# Patient Record
Sex: FEMALE | Race: OTHER | NOT HISPANIC OR LATINO | Employment: FULL TIME | URBAN - METROPOLITAN AREA
[De-identification: names, ages, dates, MRNs, and addresses within clinical notes are randomized per-mention and may not be internally consistent; named-entity substitution may affect disease eponyms.]

---

## 2024-11-19 ENCOUNTER — OFFICE VISIT (OUTPATIENT)
Dept: URGENT CARE | Age: 59
End: 2024-11-19
Payer: COMMERCIAL

## 2024-11-19 VITALS
DIASTOLIC BLOOD PRESSURE: 75 MMHG | HEART RATE: 71 BPM | SYSTOLIC BLOOD PRESSURE: 113 MMHG | HEIGHT: 62 IN | WEIGHT: 155 LBS | OXYGEN SATURATION: 97 % | BODY MASS INDEX: 28.52 KG/M2 | RESPIRATION RATE: 20 BRPM | TEMPERATURE: 98 F

## 2024-11-19 DIAGNOSIS — N30.00 ACUTE CYSTITIS WITHOUT HEMATURIA: Primary | ICD-10-CM

## 2024-11-19 DIAGNOSIS — R39.15 URGENCY OF MICTURITION: ICD-10-CM

## 2024-11-19 DIAGNOSIS — Z87.440 HX OF RECURRENT URINARY TRACT INFECTION: ICD-10-CM

## 2024-11-19 LAB
POC APPEARANCE, URINE: CLEAR
POC BILIRUBIN, URINE: NEGATIVE
POC BLOOD, URINE: NEGATIVE
POC COLOR, URINE: YELLOW
POC GLUCOSE, URINE: NEGATIVE MG/DL
POC KETONES, URINE: NEGATIVE MG/DL
POC LEUKOCYTES, URINE: ABNORMAL
POC NITRITE,URINE: NEGATIVE
POC PH, URINE: 6 PH
POC PROTEIN, URINE: NEGATIVE MG/DL
POC SPECIFIC GRAVITY, URINE: 1.01
POC UROBILINOGEN, URINE: 0.2 EU/DL

## 2024-11-19 PROCEDURE — 87086 URINE CULTURE/COLONY COUNT: CPT

## 2024-11-19 RX ORDER — NITROFURANTOIN 25; 75 MG/1; MG/1
100 CAPSULE ORAL 2 TIMES DAILY
Qty: 14 CAPSULE | Refills: 0 | Status: SHIPPED | OUTPATIENT
Start: 2024-11-19 | End: 2024-11-19 | Stop reason: SINTOL

## 2024-11-19 RX ORDER — AMOXICILLIN AND CLAVULANATE POTASSIUM 875; 125 MG/1; MG/1
1 TABLET, FILM COATED ORAL 2 TIMES DAILY
Qty: 14 TABLET | Refills: 0 | Status: SHIPPED | OUTPATIENT
Start: 2024-11-19 | End: 2024-11-26

## 2024-11-19 ASSESSMENT — PAIN SCALES - GENERAL: PAINLEVEL_OUTOF10: 0-NO PAIN

## 2024-11-20 NOTE — PROGRESS NOTES
"Subjective   Patient ID: Lia Rider is a 58 y.o. female. They present today with a chief complaint of Difficulty Urinating (Pt stated feels pressure urinating and feels like going often. Pt follows up with an urologist through ).    History of Present Illness  Lia is a 58-year-old female with history of recurrent UTIs who presents to the urgent care for evaluation of urinary urgency and sensation of incomplete voiding for several days duration.  Patient states symptoms worsened throughout the day.  Patient denies associated vomiting, severe abdominal pain or diarrhea.  Patient states she follows with a urologist for definitive management and treatment.  Patient is seeking evaluation reassurance and treatment options in the meantime.  No other symptoms or concerns otherwise reported.      Past Medical History  Allergies as of 11/19/2024 - Reviewed 11/19/2024   Allergen Reaction Noted    Nitrofurantoin monohyd/m-cryst Hives, Itching, Nausea Only, and Swelling 02/18/2013    Sulfamethoxazole-trimethoprim GI Upset 09/04/2018       (Not in a hospital admission)       No past medical history on file.    No past surgical history on file.     reports that she has never smoked. She has never used smokeless tobacco.    Review of Systems  A 10-point review of systems was performed, otherwise unremarkable unless stated in the history of present illness.                Objective    Vitals:    11/19/24 1926   BP: 113/75   BP Location: Left arm   Patient Position: Sitting   BP Cuff Size: Adult   Pulse: 71   Resp: 20   Temp: 36.7 °C (98 °F)   TempSrc: Oral   SpO2: 97%   Weight: 70.3 kg (155 lb)   Height: 1.575 m (5' 2\")     No LMP recorded.    Gen: Vitals noted and reviewed, no evidence of acute distress, well developed and afebrile.   Psych: Mood and affect appropriate for setting.  Head/Face: Atraumatic and normocephalic.   Neuro: No focal deficits noted.  Cardiac: Regular rate and rhythm no murmur.   Lungs: Clear " to auscultation throughout, No evidence of wheezing, rhonchi or crackles. Good aeration throughout.   Abdomen: Soft, minimally tender in the suprapubic region without guarding or rebound tenderness and otherwise non-tender throughout. Normoactive bowel sounds. No evidence of palpable masses. No CVA tenderness noted     Extremities: Symmetrical, No peripheral edema  Skin: Without evidence of ecchymosis, wounds, or rashes.      Point of Care Test & Imaging Results from this visit  Results for orders placed or performed in visit on 11/19/24   POCT UA Automated manually resulted   Result Value Ref Range    POC Color, Urine Yellow Straw, Yellow, Light-Yellow    POC Appearance, Urine Clear Clear    POC Glucose, Urine NEGATIVE NEGATIVE mg/dl    POC Bilirubin, Urine NEGATIVE NEGATIVE    POC Ketones, Urine NEGATIVE NEGATIVE mg/dl    POC Specific Gravity, Urine 1.010 1.005 - 1.035    POC Blood, Urine NEGATIVE NEGATIVE    POC PH, Urine 6.0 No Reference Range Established PH    POC Protein, Urine NEGATIVE NEGATIVE, 30 (1+) mg/dl    POC Urobilinogen, Urine 0.2 0.2, 1.0 EU/DL    Poc Nitrite, Urine NEGATIVE NEGATIVE    POC Leukocytes, Urine MODERATE (2+) (A) NEGATIVE      No results found.    Diagnostic study results (if any) were reviewed by Israel Rainey DO.    Assessment/Plan   Allergies, medications, history, and pertinent labs/EKGs/Imaging reviewed by Israel Rainey DO.     Medical Decision Making  Discussed with the patient symptoms and clinical presentation findings suggestive of an acute cystitis without hematuria in the setting of recurrent history of UTIs.  Advise close symptom monitoring supportive treatment follow-up with primary care provider as well as urologist.  In the meantime we agreed to prescribe Macrobid for antimicrobial coverage.  We also sent urine out for culture and will adjust treatment accordingly. We advised seeking immediate emergency medical attention if symptoms fail to improve, worsen or any  concerning symptoms arise. Patient voiced full understanding and agreement to plan.      Orders and Diagnoses  Diagnoses and all orders for this visit:  Acute cystitis without hematuria  -     Urine Culture  -     amoxicillin-pot clavulanate (Augmentin) 875-125 mg tablet; Take 1 tablet by mouth 2 times a day for 7 days. Take with food.  Urgency of micturition  -     POCT UA Automated manually resulted  Hx of recurrent urinary tract infection      Medical Admin Record      Patient disposition: Home    Electronically signed by Israel Rainey DO  7:59 PM

## 2024-11-21 LAB — BACTERIA UR CULT: NORMAL
